# Patient Record
Sex: MALE | Race: WHITE | NOT HISPANIC OR LATINO | URBAN - METROPOLITAN AREA
[De-identification: names, ages, dates, MRNs, and addresses within clinical notes are randomized per-mention and may not be internally consistent; named-entity substitution may affect disease eponyms.]

---

## 2017-02-21 ENCOUNTER — IMPORTED ENCOUNTER (OUTPATIENT)
Dept: URBAN - METROPOLITAN AREA CLINIC 50 | Facility: CLINIC | Age: 57
End: 2017-02-21

## 2018-05-01 ENCOUNTER — IMPORTED ENCOUNTER (OUTPATIENT)
Dept: URBAN - METROPOLITAN AREA CLINIC 50 | Facility: CLINIC | Age: 58
End: 2018-05-01

## 2018-05-24 ENCOUNTER — IMPORTED ENCOUNTER (OUTPATIENT)
Dept: URBAN - METROPOLITAN AREA CLINIC 50 | Facility: CLINIC | Age: 58
End: 2018-05-24

## 2018-06-18 ENCOUNTER — IMPORTED ENCOUNTER (OUTPATIENT)
Dept: URBAN - METROPOLITAN AREA CLINIC 50 | Facility: CLINIC | Age: 58
End: 2018-06-18

## 2018-11-28 NOTE — PROCEDURE NOTE: SURGICAL
<p>Prior to commencing surgery patient identification, surgical procedure, site, and side were confirmed by Dr. Gianni Jackson. Following topical proparacaine anesthesia, the patient was positioned at the YAG laser, a contact lens coupled to the cornea with methylcellulose and an axial posterior capsulotomy performed without complication using 3.4 Mj x 36. Excess methylcellulose was washed from the eye, one drop of Alphagan was instilled and the patient returned to the holding area having tolerated the procedure well and without complication. </p><p>MRN:[id number]</p>

## 2018-12-04 NOTE — PATIENT DISCUSSION
Discussed the risks/benefits of laser capsulotomy. Laser recommended. Patient elects to proceed. Pt is having pain with urination and would like a RX to help

## 2019-01-14 NOTE — PATIENT DISCUSSION
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 NO OTHER SYSTEMIC RELATED ABNORMALITIES. OBSERVE CLOSELY . INFORM PRIMARY CARE PHYSICIAN OF DIAGNOSIS AND CONSIDER SYSTEMIC WORK UP BY PRIMARY CARE PHYSICIAN. AVOID DRIVING FOR 6 WEEKS.

## 2019-01-14 NOTE — PATIENT DISCUSSION
POSTERIOR CAPSULAR FIBROSIS, OU:  VISUALLY SIGNIFICANT. SCHEDULE YAG CAPSULOTOMY OD  FIRST THEN LATER YAG CAPSULOTOMY OS IF VISUAL SYMPTOMS PERSIST.

## 2019-01-14 NOTE — PATIENT DISCUSSION
ESOTROPIA : RECOMMEND TO UPDATE AND USE PRESCRIBED GLASSES TO DECREASE SYMPTOMS AFTER YAG CAP OU. FOLLOW AS SCHEDULED.

## 2019-01-14 NOTE — PATIENT DISCUSSION
DERMATOCHALASIS / PTOSIS RUL AND NIA :  VISUALLY SIGNIFICANT TO PATIENT. SCHEDULE WITH OCULOPLASTIC SPECIALIST IF PATIENT DESIRES.

## 2019-01-14 NOTE — PATIENT DISCUSSION
Cranial Nerve Palsy Counseling:  I have explained the diagnosis of cranial nerve palsy and its pathophysiology to the patient. I discussed risk factors such as hypertension, diabetes, age and smoking. I counseled the patient on the importance of good blood pressure and blood sugar control as well as the cessation of smoking cigarettes. Return for follow-up as scheduled.

## 2019-05-06 ENCOUNTER — IMPORTED ENCOUNTER (OUTPATIENT)
Dept: URBAN - METROPOLITAN AREA CLINIC 50 | Facility: CLINIC | Age: 59
End: 2019-05-06

## 2020-05-14 ENCOUNTER — IMPORTED ENCOUNTER (OUTPATIENT)
Dept: URBAN - METROPOLITAN AREA CLINIC 50 | Facility: CLINIC | Age: 60
End: 2020-05-14

## 2021-04-17 ASSESSMENT — VISUAL ACUITY
OS_OTHER: 20/40. 20/40.
OD_CC: J1+
OD_PH: 20/25
OS_BAT: 20/40
OS_PH: 20/25
OS_SC: 20/20
OS_CC: J1+
OS_OTHER: 20/40. 20/40.
OS_CC: 20/30
OD_OTHER: 20/30. 20/40.
OD_CC: 20/20
OS_CC: 20/25-
OD_CC: 20/20
OD_BAT: 20/30
OD_OTHER: 20/30. 20/40.
OS_CC: 20/25-
OD_CC: J1+
OS_CC: J1+
OD_SC: 20/60
OD_BAT: 20/30
OD_CC: J2
OD_CC: 20/25
OS_BAT: 20/40
OS_CC: J2

## 2021-04-17 ASSESSMENT — TONOMETRY
OD_IOP_MMHG: 16
OS_IOP_MMHG: 19
OS_IOP_MMHG: 19
OD_IOP_MMHG: 19
OD_IOP_MMHG: 17
OD_IOP_MMHG: 18
OS_IOP_MMHG: 18
OS_IOP_MMHG: 19

## 2021-05-26 ENCOUNTER — PREPPED CHART (OUTPATIENT)
Dept: URBAN - METROPOLITAN AREA CLINIC 53 | Facility: CLINIC | Age: 61
End: 2021-05-26

## 2021-05-27 ENCOUNTER — ROUTINE EXAM (OUTPATIENT)
Dept: URBAN - METROPOLITAN AREA CLINIC 53 | Facility: CLINIC | Age: 61
End: 2021-05-27

## 2021-05-27 DIAGNOSIS — Z01.01: ICD-10-CM

## 2021-05-27 DIAGNOSIS — H52.03: ICD-10-CM

## 2021-05-27 DIAGNOSIS — H25.13: ICD-10-CM

## 2021-05-27 DIAGNOSIS — Z01.00: ICD-10-CM

## 2021-05-27 PROCEDURE — 92015 DETERMINE REFRACTIVE STATE: CPT

## 2021-05-27 PROCEDURE — 92014 COMPRE OPH EXAM EST PT 1/>: CPT

## 2021-05-27 ASSESSMENT — VISUAL ACUITY
OS_GLARE: 20/25
OS_CC: 20/25
OS_GLARE: 20/20
OD_CC: 20/25
OD_GLARE: 20/20
OU_CC: J1+
OD_GLARE: 20/25

## 2021-05-27 ASSESSMENT — TONOMETRY
OD_IOP_MMHG: 17
OS_IOP_MMHG: 16

## 2022-06-14 ENCOUNTER — COMPREHENSIVE EXAM (OUTPATIENT)
Dept: URBAN - METROPOLITAN AREA CLINIC 53 | Facility: CLINIC | Age: 62
End: 2022-06-14

## 2022-06-14 DIAGNOSIS — H35.363: ICD-10-CM

## 2022-06-14 DIAGNOSIS — Z01.01: ICD-10-CM

## 2022-06-14 DIAGNOSIS — H25.13: ICD-10-CM

## 2022-06-14 PROCEDURE — 92014 COMPRE OPH EXAM EST PT 1/>: CPT

## 2022-06-14 PROCEDURE — 92134 CPTRZ OPH DX IMG PST SGM RTA: CPT

## 2022-06-14 ASSESSMENT — TONOMETRY
OD_IOP_MMHG: 14
OS_IOP_MMHG: 14

## 2022-06-14 ASSESSMENT — KERATOMETRY
OS_K2POWER_DIOPTERS: 42.75
OD_K1POWER_DIOPTERS: 42.00
OD_K2POWER_DIOPTERS: 42.25
OS_AXISANGLE_DEGREES: 180
OD_AXISANGLE2_DEGREES: 102
OS_AXISANGLE2_DEGREES: 90
OS_K1POWER_DIOPTERS: 42.75
OD_AXISANGLE_DEGREES: 12

## 2022-06-14 ASSESSMENT — VISUAL ACUITY
OD_GLARE: 20/20
OS_GLARE: 20/20
OD_CC: 20/20
OS_CC: 20/20
OD_GLARE: 20/20
OS_SC: 20/20
OU_SC: 20/20
OU_CC: 20/20
OU_CC: J1
OD_SC: 20/100
OS_GLARE: 20/20

## 2022-06-14 NOTE — PATIENT DISCUSSION
Good ocular health on dilated exam today despite abnormal findings. Eyeglass prescription declined today not given. Patient instructed to call office immediately if sudden changes in vision occur. Emphasized importance of sunglasses and healthy lifestyle.

## 2023-07-26 ENCOUNTER — COMPREHENSIVE EXAM (OUTPATIENT)
Dept: URBAN - METROPOLITAN AREA CLINIC 53 | Facility: CLINIC | Age: 63
End: 2023-07-26

## 2023-07-26 DIAGNOSIS — H10.13: ICD-10-CM

## 2023-07-26 DIAGNOSIS — H35.363: ICD-10-CM

## 2023-07-26 DIAGNOSIS — Z01.01: ICD-10-CM

## 2023-07-26 DIAGNOSIS — H52.4: ICD-10-CM

## 2023-07-26 PROCEDURE — 92014 COMPRE OPH EXAM EST PT 1/>: CPT

## 2023-07-26 PROCEDURE — 92134 CPTRZ OPH DX IMG PST SGM RTA: CPT

## 2023-07-26 PROCEDURE — 92015 DETERMINE REFRACTIVE STATE: CPT

## 2023-07-26 ASSESSMENT — TONOMETRY
OS_IOP_MMHG: 17
OD_IOP_MMHG: 16

## 2023-07-26 ASSESSMENT — VISUAL ACUITY
OS_GLARE: 20/30
OD_PH: 20/25
OD_CC: 20/40
OD_GLARE: 20/25
OD_GLARE: 20/25
OS_CC: 20/25-1
OU_CC: 20/20"16IN
OS_GLARE: 20/25
OU_CC: 20/25

## 2023-07-26 ASSESSMENT — KERATOMETRY
OS_K2POWER_DIOPTERS: 43.00
OS_K1POWER_DIOPTERS: 42.50
OS_AXISANGLE2_DEGREES: 44
OD_K1POWER_DIOPTERS: 42.25
OD_AXISANGLE_DEGREES: 180
OD_AXISANGLE2_DEGREES: 90
OS_AXISANGLE_DEGREES: 134
OD_K2POWER_DIOPTERS: 42.25

## 2024-07-31 ENCOUNTER — COMPREHENSIVE EXAM (OUTPATIENT)
Dept: URBAN - METROPOLITAN AREA CLINIC 53 | Facility: CLINIC | Age: 64
End: 2024-07-31

## 2024-07-31 DIAGNOSIS — H35.363: ICD-10-CM

## 2024-07-31 DIAGNOSIS — H25.13: ICD-10-CM

## 2024-07-31 DIAGNOSIS — H52.03: ICD-10-CM

## 2024-07-31 DIAGNOSIS — H52.4: ICD-10-CM

## 2024-07-31 DIAGNOSIS — Z01.01: ICD-10-CM

## 2024-07-31 PROCEDURE — 92014 COMPRE OPH EXAM EST PT 1/>: CPT

## 2024-07-31 PROCEDURE — 92015 DETERMINE REFRACTIVE STATE: CPT

## 2024-07-31 ASSESSMENT — VISUAL ACUITY
OS_CC: 20/20
OD_PH: 20/25
OD_GLARE: 20/25
OS_GLARE: 20/20
OS_GLARE: 20/20
OD_CC: 20/30
OU_CC: 20/20
OU_CC: J1+
OD_GLARE: 20/25

## 2024-07-31 ASSESSMENT — TONOMETRY
OD_IOP_MMHG: 13
OS_IOP_MMHG: 13

## 2025-08-11 ENCOUNTER — COMPREHENSIVE EXAM (OUTPATIENT)
Age: 65
End: 2025-08-11

## 2025-08-11 DIAGNOSIS — D23.111: ICD-10-CM

## 2025-08-11 DIAGNOSIS — D23.121: ICD-10-CM

## 2025-08-11 DIAGNOSIS — Z01.01: ICD-10-CM

## 2025-08-11 DIAGNOSIS — H02.88B: ICD-10-CM

## 2025-08-11 DIAGNOSIS — H35.363: ICD-10-CM

## 2025-08-11 DIAGNOSIS — H52.4: ICD-10-CM

## 2025-08-11 DIAGNOSIS — H02.88A: ICD-10-CM

## 2025-08-11 DIAGNOSIS — H10.13: ICD-10-CM

## 2025-08-11 DIAGNOSIS — H25.13: ICD-10-CM

## 2025-08-11 PROCEDURE — 92014 COMPRE OPH EXAM EST PT 1/>: CPT

## 2025-08-11 PROCEDURE — 92015 DETERMINE REFRACTIVE STATE: CPT
